# Patient Record
Sex: MALE | Race: WHITE | Employment: OTHER | URBAN - METROPOLITAN AREA
[De-identification: names, ages, dates, MRNs, and addresses within clinical notes are randomized per-mention and may not be internally consistent; named-entity substitution may affect disease eponyms.]

---

## 2020-09-06 ENCOUNTER — APPOINTMENT (OUTPATIENT)
Dept: GENERAL RADIOLOGY | Age: 70
DRG: 068 | End: 2020-09-06
Payer: MEDICARE

## 2020-09-06 ENCOUNTER — APPOINTMENT (OUTPATIENT)
Dept: CT IMAGING | Age: 70
DRG: 068 | End: 2020-09-06
Payer: MEDICARE

## 2020-09-06 ENCOUNTER — HOSPITAL ENCOUNTER (INPATIENT)
Age: 70
LOS: 2 days | Discharge: HOME | DRG: 068 | End: 2020-09-08
Attending: EMERGENCY MEDICINE | Admitting: INTERNAL MEDICINE
Payer: MEDICARE

## 2020-09-06 PROBLEM — G45.9 TIA (TRANSIENT ISCHEMIC ATTACK): Status: ACTIVE | Noted: 2020-09-06

## 2020-09-06 LAB
A/G RATIO: 1.3 (ref 1.1–2.2)
ALBUMIN SERPL-MCNC: 4.2 G/DL (ref 3.4–5)
ALP BLD-CCNC: 61 U/L (ref 40–129)
ALT SERPL-CCNC: 10 U/L (ref 10–40)
ANION GAP SERPL CALCULATED.3IONS-SCNC: 8 MMOL/L (ref 3–16)
APTT: 25.3 SEC (ref 24.2–36.2)
AST SERPL-CCNC: 28 U/L (ref 15–37)
BASE EXCESS VENOUS: 2.8 MMOL/L (ref -2–3)
BASOPHILS ABSOLUTE: 0 K/UL (ref 0–0.2)
BASOPHILS RELATIVE PERCENT: 0.9 %
BILIRUB SERPL-MCNC: 0.4 MG/DL (ref 0–1)
BUN BLDV-MCNC: 26 MG/DL (ref 7–20)
CALCIUM SERPL-MCNC: 8.9 MG/DL (ref 8.3–10.6)
CARBOXYHEMOGLOBIN: 1.9 % (ref 0–1.5)
CHLORIDE BLD-SCNC: 104 MMOL/L (ref 99–110)
CO2: 27 MMOL/L (ref 21–32)
CREAT SERPL-MCNC: 0.9 MG/DL (ref 0.8–1.3)
EOSINOPHILS ABSOLUTE: 0.2 K/UL (ref 0–0.6)
EOSINOPHILS RELATIVE PERCENT: 5 %
GFR AFRICAN AMERICAN: >60
GFR NON-AFRICAN AMERICAN: >60
GLOBULIN: 3.2 G/DL
GLUCOSE BLD-MCNC: 101 MG/DL (ref 70–99)
GLUCOSE BLD-MCNC: 93 MG/DL (ref 70–99)
HCO3 VENOUS: 27.2 MMOL/L (ref 24–28)
HCT VFR BLD CALC: 43.3 % (ref 40.5–52.5)
HEMOGLOBIN, VEN, REDUCED: 18.6 %
HEMOGLOBIN: 14.7 G/DL (ref 13.5–17.5)
INR BLD: 1.33 (ref 0.86–1.14)
LACTIC ACID: 1.1 MMOL/L (ref 0.4–2)
LYMPHOCYTES ABSOLUTE: 1.5 K/UL (ref 1–5.1)
LYMPHOCYTES RELATIVE PERCENT: 31.7 %
MCH RBC QN AUTO: 32 PG (ref 26–34)
MCHC RBC AUTO-ENTMCNC: 34.1 G/DL (ref 31–36)
MCV RBC AUTO: 93.9 FL (ref 80–100)
METHEMOGLOBIN VENOUS: 0.5 % (ref 0–1.5)
MONOCYTES ABSOLUTE: 0.7 K/UL (ref 0–1.3)
MONOCYTES RELATIVE PERCENT: 13.8 %
NEUTROPHILS ABSOLUTE: 2.4 K/UL (ref 1.7–7.7)
NEUTROPHILS RELATIVE PERCENT: 48.6 %
O2 SAT, VEN: 81 %
PCO2, VEN: 43.1 MMHG (ref 41–51)
PDW BLD-RTO: 13.3 % (ref 12.4–15.4)
PERFORMED ON: NORMAL
PH VENOUS: 7.42 (ref 7.35–7.45)
PLATELET # BLD: 132 K/UL (ref 135–450)
PMV BLD AUTO: 8.3 FL (ref 5–10.5)
PO2, VEN: 45.5 MMHG (ref 25–40)
POTASSIUM REFLEX MAGNESIUM: 5 MMOL/L (ref 3.5–5.1)
PRO-BNP: 1087 PG/ML (ref 0–124)
PROTHROMBIN TIME: 15.5 SEC (ref 10–13.2)
RBC # BLD: 4.61 M/UL (ref 4.2–5.9)
SODIUM BLD-SCNC: 139 MMOL/L (ref 136–145)
TCO2 CALC VENOUS: 29 MMOL/L
TOTAL PROTEIN: 7.4 G/DL (ref 6.4–8.2)
TROPONIN: <0.01 NG/ML
WBC # BLD: 4.9 K/UL (ref 4–11)

## 2020-09-06 PROCEDURE — 96366 THER/PROPH/DIAG IV INF ADDON: CPT

## 2020-09-06 PROCEDURE — 70496 CT ANGIOGRAPHY HEAD: CPT

## 2020-09-06 PROCEDURE — 93005 ELECTROCARDIOGRAM TRACING: CPT | Performed by: INTERNAL MEDICINE

## 2020-09-06 PROCEDURE — 85730 THROMBOPLASTIN TIME PARTIAL: CPT

## 2020-09-06 PROCEDURE — 82803 BLOOD GASES ANY COMBINATION: CPT

## 2020-09-06 PROCEDURE — 85025 COMPLETE CBC W/AUTO DIFF WBC: CPT

## 2020-09-06 PROCEDURE — 85610 PROTHROMBIN TIME: CPT

## 2020-09-06 PROCEDURE — 70450 CT HEAD/BRAIN W/O DYE: CPT

## 2020-09-06 PROCEDURE — 2000000000 HC ICU R&B

## 2020-09-06 PROCEDURE — 6370000000 HC RX 637 (ALT 250 FOR IP): Performed by: STUDENT IN AN ORGANIZED HEALTH CARE EDUCATION/TRAINING PROGRAM

## 2020-09-06 PROCEDURE — 83605 ASSAY OF LACTIC ACID: CPT

## 2020-09-06 PROCEDURE — 70498 CT ANGIOGRAPHY NECK: CPT

## 2020-09-06 PROCEDURE — 2580000003 HC RX 258: Performed by: STUDENT IN AN ORGANIZED HEALTH CARE EDUCATION/TRAINING PROGRAM

## 2020-09-06 PROCEDURE — 1200000000 HC SEMI PRIVATE

## 2020-09-06 PROCEDURE — 6360000004 HC RX CONTRAST MEDICATION: Performed by: EMERGENCY MEDICINE

## 2020-09-06 PROCEDURE — 80053 COMPREHEN METABOLIC PANEL: CPT

## 2020-09-06 PROCEDURE — 84484 ASSAY OF TROPONIN QUANT: CPT

## 2020-09-06 PROCEDURE — 83880 ASSAY OF NATRIURETIC PEPTIDE: CPT

## 2020-09-06 PROCEDURE — 71045 X-RAY EXAM CHEST 1 VIEW: CPT

## 2020-09-06 PROCEDURE — 6360000002 HC RX W HCPCS: Performed by: PHYSICIAN ASSISTANT

## 2020-09-06 PROCEDURE — 96365 THER/PROPH/DIAG IV INF INIT: CPT

## 2020-09-06 PROCEDURE — 99285 EMERGENCY DEPT VISIT HI MDM: CPT

## 2020-09-06 RX ORDER — HEPARIN SODIUM 5000 [USP'U]/ML
80 INJECTION, SOLUTION INTRAVENOUS; SUBCUTANEOUS ONCE
Status: COMPLETED | OUTPATIENT
Start: 2020-09-06 | End: 2020-09-06

## 2020-09-06 RX ORDER — POLYETHYLENE GLYCOL 3350 17 G/17G
17 POWDER, FOR SOLUTION ORAL DAILY PRN
Status: DISCONTINUED | OUTPATIENT
Start: 2020-09-06 | End: 2020-09-08 | Stop reason: HOSPADM

## 2020-09-06 RX ORDER — HEPARIN SODIUM 10000 [USP'U]/100ML
13 INJECTION, SOLUTION INTRAVENOUS CONTINUOUS
Status: DISCONTINUED | OUTPATIENT
Start: 2020-09-06 | End: 2020-09-07 | Stop reason: ALTCHOICE

## 2020-09-06 RX ORDER — PROMETHAZINE HYDROCHLORIDE 25 MG/1
12.5 TABLET ORAL EVERY 6 HOURS PRN
Status: DISCONTINUED | OUTPATIENT
Start: 2020-09-06 | End: 2020-09-08 | Stop reason: HOSPADM

## 2020-09-06 RX ORDER — HEPARIN SODIUM 5000 [USP'U]/ML
40 INJECTION, SOLUTION INTRAVENOUS; SUBCUTANEOUS PRN
Status: DISCONTINUED | OUTPATIENT
Start: 2020-09-06 | End: 2020-09-07 | Stop reason: ALTCHOICE

## 2020-09-06 RX ORDER — SODIUM CHLORIDE 0.9 % (FLUSH) 0.9 %
10 SYRINGE (ML) INJECTION EVERY 12 HOURS SCHEDULED
Status: DISCONTINUED | OUTPATIENT
Start: 2020-09-06 | End: 2020-09-08 | Stop reason: HOSPADM

## 2020-09-06 RX ORDER — ASPIRIN 81 MG/1
81 TABLET ORAL DAILY
Status: DISCONTINUED | OUTPATIENT
Start: 2020-09-06 | End: 2020-09-08 | Stop reason: HOSPADM

## 2020-09-06 RX ORDER — ASPIRIN 300 MG/1
300 SUPPOSITORY RECTAL DAILY
Status: DISCONTINUED | OUTPATIENT
Start: 2020-09-06 | End: 2020-09-08 | Stop reason: HOSPADM

## 2020-09-06 RX ORDER — HEPARIN SODIUM 5000 [USP'U]/ML
80 INJECTION, SOLUTION INTRAVENOUS; SUBCUTANEOUS PRN
Status: DISCONTINUED | OUTPATIENT
Start: 2020-09-06 | End: 2020-09-07 | Stop reason: ALTCHOICE

## 2020-09-06 RX ORDER — ATORVASTATIN CALCIUM 80 MG/1
80 TABLET, FILM COATED ORAL NIGHTLY
Status: DISCONTINUED | OUTPATIENT
Start: 2020-09-06 | End: 2020-09-08

## 2020-09-06 RX ORDER — LABETALOL HYDROCHLORIDE 5 MG/ML
10 INJECTION, SOLUTION INTRAVENOUS EVERY 10 MIN PRN
Status: DISCONTINUED | OUTPATIENT
Start: 2020-09-06 | End: 2020-09-08 | Stop reason: HOSPADM

## 2020-09-06 RX ORDER — SODIUM CHLORIDE 0.9 % (FLUSH) 0.9 %
10 SYRINGE (ML) INJECTION PRN
Status: DISCONTINUED | OUTPATIENT
Start: 2020-09-06 | End: 2020-09-08 | Stop reason: HOSPADM

## 2020-09-06 RX ORDER — ONDANSETRON 2 MG/ML
4 INJECTION INTRAMUSCULAR; INTRAVENOUS EVERY 6 HOURS PRN
Status: DISCONTINUED | OUTPATIENT
Start: 2020-09-06 | End: 2020-09-08 | Stop reason: HOSPADM

## 2020-09-06 RX ADMIN — Medication 10 ML: at 21:00

## 2020-09-06 RX ADMIN — HEPARIN SODIUM 18 UNITS/KG/HR: 10000 INJECTION, SOLUTION INTRAVENOUS at 18:30

## 2020-09-06 RX ADMIN — ASPIRIN 81 MG: 81 TABLET, COATED ORAL at 21:08

## 2020-09-06 RX ADMIN — ATORVASTATIN CALCIUM 80 MG: 80 TABLET, FILM COATED ORAL at 21:08

## 2020-09-06 RX ADMIN — HEPARIN SODIUM 6200 UNITS: 5000 INJECTION INTRAVENOUS; SUBCUTANEOUS at 18:30

## 2020-09-06 RX ADMIN — IOPAMIDOL 80 ML: 755 INJECTION, SOLUTION INTRAVENOUS at 17:55

## 2020-09-06 ASSESSMENT — ENCOUNTER SYMPTOMS
NAUSEA: 0
VOMITING: 0
SHORTNESS OF BREATH: 0
ABDOMINAL PAIN: 0

## 2020-09-06 ASSESSMENT — PAIN SCALES - GENERAL: PAINLEVEL_OUTOF10: 0

## 2020-09-06 NOTE — ED PROVIDER NOTES
ED Attending Attestation Note     Date of evaluation: 9/6/2020    This patient was seen by the advance practice provider. I have seen and examined the patient, agree with the workup, evaluation, management and diagnosis. The care plan has been discussed. I have reviewed the ECG and concur with the MATHEW's interpretation. My assessment reveals a generally quite well-appearing gentleman, pleasantly conversational, in no acute distress. He is brought to the emergency department by his wife for 2 subsequent episodes in which she noted left-sided facial droop and slurring of speech, although the patient himself did not notice the symptoms at these times. The second episode, occurring 2 to 3 hours after the first episode, was also witnessed by their son. At the time of my examination, the patient is entirely asymptomatic with an NIH stroke scale of 0. However, he is found to be in new onset a flutter, with no prior history of this, which is very concerning in light of his back to back high risk TIAs. As a member of the [address] [place] [address] stroke team, I have determined that the patient is not a candidate for TPA, and does not appear to have a significant intracranial LVO. However in the setting of these high risk TIAs and new onset a flutter, he does require frequent neuro checks in the initial portion of his admission, as he is at high risk of completing a stroke in the short-term. Critical Care:  Due to the immediate potential for life-threatening deterioration due to stereotyped TIAs with a high risk of CVA and new onset atrial flutter, I spent a total of approximately 40 minutes providing critical care. This time excludes time spent performing procedures but includes time spent on direct patient care, history retrieval, review of the chart, and discussions with patient, family, and consultant(s).        Daja Jones MD  09/06/20 6697

## 2020-09-06 NOTE — ED NOTES
Report called to ICU RN, will be down shortly to take patient to ICU     Bebe Diop RN  09/06/20 4705

## 2020-09-06 NOTE — ED PROVIDER NOTES
[facility] [address] [facility]          PHYSICIAN ASSISTANT NOTE       Date of evaluation: 9/6/2020    Chief Complaint     Facial Droop (x2 episodes today of left sided facial droop and slurred speech. presently pt at baseline a/o x4, no droop or slurred speech)      History of Present Illness     HPI: Valentine Traore is a 78 y.o. male with no pertinent PMH who presents to the emergency department with episodes of facial droop and slurred speech. The patient's wife noticed at 2 PM that he had right-sided facial droop and had slurred speech. The patient himself did not recognize any deficits at this time. She notes this lasted for approximately 5 minutes and had resolved by the time that the patient looked in the bathroom mirror. He then took a nap and when he woke up she notes that she performed several test to help determine whether or not he was having a stroke which were all negative. They then went to  their son and at 4:45 PM their son asked the patient again why his right side of his face was drooped and he had slurred speech. His wife notes that this episode lasted for longer, potentially 15 minutes though he is asymptomatic at this time. The patient notes that he has felt completely normal today, and has not appreciated anything abnormal.  He denies any headache, dizziness, chest pain or shortness of breath. He denies any personal history of A. fib and is not anticoagulated. He has never had similar symptoms in the past.  He denies any abdominal pain, nausea or vomiting. With the exception of the above, there are no aggravating or alleviating factors. Review of Systems     Review of Systems   Constitutional: Negative for chills and fever. Respiratory: Negative for shortness of breath. Cardiovascular: Negative for chest pain, palpitations and leg swelling. Gastrointestinal: Negative for abdominal pain, nausea and vomiting.    Neurological: Positive for facial asymmetry and speech difficulty. Negative for dizziness, syncope, weakness, light-headedness, numbness and headaches. As stated above, all other systems reviewed and are otherwise negative. Past Medical, Surgical, Family, and Social History     He has a past medical history of Hypertension, Seborrheic keratosis, and Xerotic eczema. He has no past surgical history on file. His family history is not on file. He reports that he has never smoked. He has never used smokeless tobacco. He reports previous alcohol use. He reports that he does not use drugs. Medications     There are no discharge medications for this patient. Allergies     He has No Known Allergies. Physical Exam     INITIAL VITALS: BP: 121/87, Temp: 98.2 °F (36.8 °C), Pulse: 84, Resp: 24, SpO2: 96 %  Physical Exam  Vitals signs and nursing note reviewed. Constitutional:       Appearance: Normal appearance. HENT:      Head: Normocephalic and atraumatic. Nose: Nose normal.      Mouth/Throat:      Mouth: Mucous membranes are moist.      Pharynx: Oropharynx is clear. Eyes:      Extraocular Movements: Extraocular movements intact. Neck:      Musculoskeletal: Normal range of motion. Cardiovascular:      Rate and Rhythm: Normal rate. Pulses: Normal pulses. Pulmonary:      Effort: Pulmonary effort is normal.   Abdominal:      General: Abdomen is flat. There is no distension. Palpations: Abdomen is soft. Tenderness: There is no abdominal tenderness. There is no guarding or rebound. Musculoskeletal: Normal range of motion. Right lower leg: No edema. Left lower leg: No edema. Skin:     General: Skin is warm and dry. Neurological:      Mental Status: He is alert. GCS: GCS eye subscore is 4. GCS verbal subscore is 5. GCS motor subscore is 6. Cranial Nerves: Cranial nerves are intact. No cranial nerve deficit. Sensory: Sensation is intact. No sensory deficit.       Motor: Motor function is intact. No weakness, tremor or pronator drift. Coordination: Coordination is intact. Romberg sign negative. Coordination normal. Heel to Shin Test normal.   Psychiatric:         Mood and Affect: Mood normal.         Behavior: Behavior normal.         Diagnostic Results     EKG   Interpreted in conjunction with emergency department physician Lurdes Seay MD  Rhythm: A. Fib vs flutter with premature aberrantly conducted complexes  Rate: normal  Axis: normal  : none  ST Segments: no acute change  T Waves: no acute change  Q Waves:none  Clinical Impression: atrial fibrillation vs. Flutter (new onset)    RADIOLOGY:  CTA NECK W CONTRAST   Final Result      1. No aneurysms, vascular occlusions, or intracranial stenoses identified. 2.  No significant stenosis in the extracranial vertebral or carotid arteries. CTA HEAD W CONTRAST   Final Result      1. No aneurysms, vascular occlusions, or intracranial stenoses identified. 2.  No significant stenosis in the extracranial vertebral or carotid arteries. XR CHEST PORTABLE   Final Result      Cardiomegaly and pulmonary vascular congestion. No acute pulmonary consolidation. CT Head WO Contrast   Final Result      1. No acute intracranial hemorrhage. 2.  Subacute versus chronic small left parietal lobe infarct with no prior studies available for comparison.           LABS:   Results for orders placed or performed during the hospital encounter of 09/06/20   CBC Auto Differential   Result Value Ref Range    WBC 4.9 4.0 - 11.0 K/uL    RBC 4.61 4.20 - 5.90 M/uL    Hemoglobin 14.7 13.5 - 17.5 g/dL    Hematocrit 43.3 40.5 - 52.5 %    MCV 93.9 80.0 - 100.0 fL    MCH 32.0 26.0 - 34.0 pg    MCHC 34.1 31.0 - 36.0 g/dL    RDW 13.3 12.4 - 15.4 %    Platelets 925 (L) 205 - 450 K/uL    MPV 8.3 5.0 - 10.5 fL    Neutrophils % 48.6 %    Lymphocytes % 31.7 %    Monocytes % 13.8 %    Eosinophils % 5.0 %    Basophils % 0.9 %    Neutrophils Absolute 2.4 1.7 - Hx, Allergies, and Family Hx were reviewed. The patient was given the following medications:  Orders Placed This Encounter   Medications    iopamidol (ISOVUE-370) 76 % injection 80 mL    heparin (porcine) injection 6,200 Units    heparin (porcine) injection 6,200 Units    heparin (porcine) injection 3,100 Units    heparin 25,000 units in dextrose 5% 250 mL infusion       CONSULTS:  IP CONSULT TO HOSPITALIST  IP CONSULT TO CRITICAL CARE  IP CONSULT TO NEUROLOGY  IP CONSULT TO CRITICAL CARE    MEDICAL DECISION MAKING / ASSESSMENT / PLAN     Vitals:    09/06/20 1735 09/06/20 1830 09/06/20 1900 09/06/20 1930   BP: 121/87 (!) 121/93 (!) 116/93 (!) 127/93   Pulse: 84 84 81 79   Resp: 24 18     Temp: 98.2 °F (36.8 °C)      TempSrc: Oral      SpO2: 96% 97% 96% 96%   Weight: 171 lb (77.6 kg)      Height: 5' 9\" (1.753 m)          Kevin Morton is a 78 y.o. male who presents to the emergency department with specific episodes of slurred speech and facial droop. The first episode lasted less than 5 minutes and occurred at 2 PM.  The second episode started at 4:45 PM and lasted for an estimated 15 minutes. The patient is currently asymptomatic and has an NIH stroke scale of 0. He denies any history of arrhythmias there was currently in A. fib versus a flutter that is rate controlled. He denies any chest pain, shortness of breath or palpitations either today or over the past several days. The patient has remained hemodynamically stable throughout their stay in the emergency department, and appears well overall. The patient has a completely nonfocal neurologic exam at this time. Given the concern for completion of patient's potential stroke a code stroke was called and the patient was taken to CT scan that shows no acute findings. His CTAs are negative for LVO as well. After his CT head was negative for bleed the patient was started on heparin since he has been in A. fib for an unknown duration of time.   Basic lab work is unremarkable and his EKG shows rate controlled A. fib versus a flutter. At this time the patient will be admitted to the ICU for every hour neuro checks at least for the next 12 to 24 hours to ensure that he does not have an additional TIA or stroke. This is discussed with the patient and his wife are in agreement with the plan. My attending for this case with Dr. Tom Bailey was on the stroke team and was carefully consulted. At this point in time, patient will require admission to the hospital for further management of their clinical condition. I spoke with the admitting team who is in agreement with plan for admission. Patient and family are in agreement with plan for admission. Patient will be cared for in the ED prior to a bed becoming available upstairs. The patient was evaluated by myself and the ED Attending Physician, Dr. Tom Bailey. All management and disposition plans were discussed and agreed upon. Clinical Impression     1. TIA (transient ischemic attack)    2. Atrial fibrillation, unspecified type [facility])        This note was dictated using voice-recognition software, which occasionally leads to inadvertent typographic errors. Disposition     DISPOSITION  - Admit.       Wesley Shortma  09/06/20 2031

## 2020-09-06 NOTE — ED TRIAGE NOTES
PT presents to the ED from home ambulatory with c/o \"stroke like symptoms\" Pts wife reports that at 1400 today and again at [facility] 81. pt had left sided facial droop and slurred speech. Pts wife reports the 1st time was about 5 minutes in duration and the 2nd time the duration was for about 15-20 mins. Presently pt is a/o x4, moving x4 extremities equally without weakness or drift. No facial droop or slurred speech presently.

## 2020-09-06 NOTE — H&P
ICU History and Physical  PGY-1    [facility]P: [facility] ([facility])    Code:Full Code  Admit Date: 9/6/2020  Diet: Diet NPO Effective Now      History of present illness:      CC: Slurred speech and facial droop    Patient is a 78 y.o. male with no PMHx presented to ED with chief complain of facial droop and slurred speech. According to his wife, she noticed that patient had facial droop and slurred speech at 2 PM. She mentions that this episode last 4-5 minutes and resolved spontaneously. However, patient did not noticed any deficit at that time. Patient had another episode of facial droop and slurred speech around 4:45. According to his wife, his facial droop and slurred speech lasted longer, about 15 minutes. Patient mentions that he has not noticed anything abnormal today. He denies having chest pain, headache, palpitation, shortness of breath, dizziness, palpitation. He denies having nausea, vomiting, fever, abdominal pain, constipation/diarhea, and lower extremity edema. In ED, patient had A-fib with normal rate. Given the concern for potential stroke, code stroke was called. CT head was done, No acute intracranial hemorrhage, Subacute versus chronic small left parietal lobe infarct with no prior studies available for comparison. CTA neck with contrast: No aneurysms, vascular occlusions, or intracranial stenoses identified, No significant stenosis in the extracranial vertebral or carotid arteries. CTA head with contrast: No aneurysms, vascular occlusions, or intracranial stenoses identified,No significant stenosis in the extracranial vertebral or carotid arteries. Chest X-ray: Cardiomegaly and pulmonary vascular congestion, No acute pulmonary consolidation. ROS:   All other systems reviewed and are negative. Past Medical / Surgical History:    Past Medical History:   Diagnosis Date    Hypertension     Seborrheic keratosis     Xerotic eczema      History reviewed.  No pertinent surgical history. Medications Prior to Admission:    No current facility-administered medications on file prior to encounter. No current outpatient medications on file prior to encounter. Allergies:  Patient has no known allergies. Social History:   TOBACCO:   reports that he has never smoked. He has never used smokeless tobacco.       ETOH:   reports previous alcohol use. Patient currently lives with his wife. Family History:   History reviewed. No pertinent family history. Vital/I&O/Physical examination:   VS:  BP (!) 128/100   Pulse 87   Temp 98.3 °F (36.8 °C) (Oral)   Resp 20   Ht 5' 9\" (1.753 m)   Wt 163 lb 12.8 oz (74.3 kg)   SpO2 96%   BMI 24.19 kg/m²     I/O:  No intake or output data in the 24 hours ending 09/06/20 2058    PE:  Physical Exam  Constitutional:       Appearance: Normal appearance. Not in acute distress. HENT:      Head: Normocephalic and atraumatic. Nose: Nose normal.      Mouth: Mucous membranes are moist.      Pharynx: Oropharynx is clear. Eyes:      Extraocular Movements: Extraocular movements intact. Neck:      Musculoskeletal: Normal range of motion. Cardiovascular:      Rate and Rhythm: S1, S2 present, Normal rate. no murmue     Pulses: Normal pulses. Pulmonary:      Effort: Pulmonary effort is normal.   Abdominal:      General: Abdomen is flat. There is no distension. Palpations: Abdomen is soft. Tenderness: There is no abdominal tenderness. There is no guarding or rebound. Musculoskeletal: Normal range of motion. Right lower leg: No edema. No cyanosis     Left lower leg: No edema. No cyanosis  Skin:     General: Skin is warm and dry.    Neurological:      Mental Status: AAO x3, CN II-XII intact,      Psychiatric:         Mood and Affect: Mood normal.         Behavior: Behavior normal.     Labs & Imaging:   LABS:  CBC:   Recent Labs     09/06/20  1745   WBC 4.9   HGB 14.7   HCT 43.3   *   MCV 93.9 Renal:   Recent Labs     09/06/20  1745      K 5.0      CO2 27   BUN 26*   CREATININE 0.9   GLUCOSE 101*   ANIONGAP 8     Hepatic:   Recent Labs     09/06/20 1745   AST 28   ALT 10   BILITOT 0.4   ALKPHOS 61     Troponin:   Recent Labs     09/06/20 1745   TROPONINI <0.01     BNP: No results for input(s): BNP in the last 72 hours. Lipids: No results for input(s): CHOL, HDL in the last 72 hours. Invalid input(s): LDLCALCU, TRIGLYCERIDE  INR:   Recent Labs     09/06/20 1745   INR 1.33*     Lactate: No results for input(s): LACTATE in the last 72 hours. ABGs:No results for input(s): PHART, ZIP2JVS, PO2ART, RFT1EGO, BEART, THGBART, M1PABSJE, GSI9LZH in the last 72 hours. UA:No results for input(s): NITRITE, COLORU, PHUR, LABCAST, WBCUA, RBCUA, MUCUS, TRICHOMONAS, YEAST, BACTERIA, CLARITYU, SPECGRAV, LEUKOCYTESUR, UROBILINOGEN, BILIRUBINUR, BLOODU, GLUCOSEU, AMORPHOUS in the last 72 hours. Invalid input(s): KETONESU     IMAGING:  CTA NECK W CONTRAST   Final Result      1. No aneurysms, vascular occlusions, or intracranial stenoses identified. 2.  No significant stenosis in the extracranial vertebral or carotid arteries. CTA HEAD W CONTRAST   Final Result      1. No aneurysms, vascular occlusions, or intracranial stenoses identified. 2.  No significant stenosis in the extracranial vertebral or carotid arteries. XR CHEST PORTABLE   Final Result      Cardiomegaly and pulmonary vascular congestion. No acute pulmonary consolidation. CT Head WO Contrast   Final Result      1. No acute intracranial hemorrhage. 2.  Subacute versus chronic small left parietal lobe infarct with no prior studies available for comparison. MRI brain with and without contrast    (Results Pending)       Assessment & Plan:    Alcira Newby is a 78 y.o. male with no PMHx presented to ED with chief complain of facial droop and slurred speech.      TIA: Pt had two episodes of slurred speech and right facial droop that was resolved spontaneously. CT head: No acute intracranial hemorrhage. CTA neck with contrast: No aneurysms, vascular occlusions, or intracranial stenoses identified, No significant stenosis in the extracranial vertebral or carotid arteries. CTA head with contrast: No aneurysms, vascular occlusions, or intracranial stenoses identified,No significant stenosis in the extracranial vertebral or carotid arteries. - aspirin 81 mg  - heparin ggt  - atorvastatin 80 mg  - Neuro check q 1hr  - Neurology consult  - Lipid panel  - MRI brain with and without contrast   - Hemoglobin A1c   - Echo   - Telemetry monitoring     Newly diagnosed A-fib: Pt presented to ED and was noticed to have new onset A-Fib with normal rate.    - CXY6PD5-[id number] score: 3, [age: 70 (score 1), TIA (score 2)]   - Heparin drip      Code Status: Full Code  FEN:  Diet NPO Effective Now  DISPO: ICU      This patient will be discussed with attending, Dr. Dima Dennison MD.    Meme Myers MD, PGY- 1  Contact via [employer]  9/6/2020,  8:58 PM

## 2020-09-07 PROBLEM — I48.19 PERSISTENT ATRIAL FIBRILLATION (HCC): Status: ACTIVE | Noted: 2020-09-07

## 2020-09-07 PROBLEM — I10 ESSENTIAL HYPERTENSION: Status: ACTIVE | Noted: 2020-09-07

## 2020-09-07 LAB
ALBUMIN SERPL-MCNC: 4.3 G/DL (ref 3.4–5)
ANION GAP SERPL CALCULATED.3IONS-SCNC: 10 MMOL/L (ref 3–16)
ANTI-XA UNFRAC HEPARIN: 0.8 IU/ML (ref 0.3–0.7)
ANTI-XA UNFRAC HEPARIN: 1.18 IU/ML (ref 0.3–0.7)
BUN BLDV-MCNC: 16 MG/DL (ref 7–20)
CALCIUM SERPL-MCNC: 9.5 MG/DL (ref 8.3–10.6)
CHLORIDE BLD-SCNC: 99 MMOL/L (ref 99–110)
CHOLESTEROL, TOTAL: 151 MG/DL (ref 0–199)
CO2: 27 MMOL/L (ref 21–32)
CREAT SERPL-MCNC: 0.8 MG/DL (ref 0.8–1.3)
EKG ATRIAL RATE: 441 BPM
EKG DIAGNOSIS: NORMAL
EKG Q-T INTERVAL: 384 MS
EKG QRS DURATION: 86 MS
EKG QTC CALCULATION (BAZETT): 456 MS
EKG R AXIS: 19 DEGREES
EKG T AXIS: 16 DEGREES
EKG VENTRICULAR RATE: 85 BPM
GFR AFRICAN AMERICAN: >60
GFR NON-AFRICAN AMERICAN: >60
GLUCOSE BLD-MCNC: 89 MG/DL (ref 70–99)
HCT VFR BLD CALC: 43.6 % (ref 40.5–52.5)
HDLC SERPL-MCNC: 29 MG/DL (ref 40–60)
HEMOGLOBIN: 15.1 G/DL (ref 13.5–17.5)
LDL CHOLESTEROL CALCULATED: 109 MG/DL
MCH RBC QN AUTO: 32 PG (ref 26–34)
MCHC RBC AUTO-ENTMCNC: 34.7 G/DL (ref 31–36)
MCV RBC AUTO: 92.3 FL (ref 80–100)
PDW BLD-RTO: 13.2 % (ref 12.4–15.4)
PHOSPHORUS: 2.7 MG/DL (ref 2.5–4.9)
PLATELET # BLD: 123 K/UL (ref 135–450)
PMV BLD AUTO: 8.8 FL (ref 5–10.5)
POTASSIUM SERPL-SCNC: 4 MMOL/L (ref 3.5–5.1)
RBC # BLD: 4.72 M/UL (ref 4.2–5.9)
SODIUM BLD-SCNC: 136 MMOL/L (ref 136–145)
TRIGL SERPL-MCNC: 66 MG/DL (ref 0–150)
VLDLC SERPL CALC-MCNC: 13 MG/DL
WBC # BLD: 5.3 K/UL (ref 4–11)

## 2020-09-07 PROCEDURE — 99223 1ST HOSP IP/OBS HIGH 75: CPT | Performed by: INTERNAL MEDICINE

## 2020-09-07 PROCEDURE — 6370000000 HC RX 637 (ALT 250 FOR IP): Performed by: PHYSICIAN ASSISTANT

## 2020-09-07 PROCEDURE — 6370000000 HC RX 637 (ALT 250 FOR IP): Performed by: STUDENT IN AN ORGANIZED HEALTH CARE EDUCATION/TRAINING PROGRAM

## 2020-09-07 PROCEDURE — 85520 HEPARIN ASSAY: CPT

## 2020-09-07 PROCEDURE — 94760 N-INVAS EAR/PLS OXIMETRY 1: CPT

## 2020-09-07 PROCEDURE — 92610 EVALUATE SWALLOWING FUNCTION: CPT

## 2020-09-07 PROCEDURE — 83036 HEMOGLOBIN GLYCOSYLATED A1C: CPT

## 2020-09-07 PROCEDURE — 85027 COMPLETE CBC AUTOMATED: CPT

## 2020-09-07 PROCEDURE — 36415 COLL VENOUS BLD VENIPUNCTURE: CPT

## 2020-09-07 PROCEDURE — 2580000003 HC RX 258: Performed by: STUDENT IN AN ORGANIZED HEALTH CARE EDUCATION/TRAINING PROGRAM

## 2020-09-07 PROCEDURE — 1200000000 HC SEMI PRIVATE

## 2020-09-07 PROCEDURE — 80061 LIPID PANEL: CPT

## 2020-09-07 PROCEDURE — 80069 RENAL FUNCTION PANEL: CPT

## 2020-09-07 RX ADMIN — ATORVASTATIN CALCIUM 80 MG: 80 TABLET, FILM COATED ORAL at 20:41

## 2020-09-07 RX ADMIN — Medication 10 ML: at 20:41

## 2020-09-07 RX ADMIN — APIXABAN 5 MG: 5 TABLET, FILM COATED ORAL at 13:19

## 2020-09-07 RX ADMIN — APIXABAN 5 MG: 5 TABLET, FILM COATED ORAL at 20:40

## 2020-09-07 RX ADMIN — ASPIRIN 81 MG: 81 TABLET, COATED ORAL at 08:15

## 2020-09-07 ASSESSMENT — PAIN SCALES - GENERAL
PAINLEVEL_OUTOF10: 0

## 2020-09-07 NOTE — PROGRESS NOTES
reviewed and are negative. Past Medical / Surgical History:    Past Medical History:   Diagnosis Date    Hypertension     Seborrheic keratosis     Xerotic eczema      History reviewed. No pertinent surgical history. Medications Prior to Admission:    No current facility-administered medications on file prior to encounter. No current outpatient medications on file prior to encounter. Allergies:  Patient has no known allergies. Social History:   TOBACCO:   reports that he has never smoked. He has never used smokeless tobacco.       ETOH:   reports previous alcohol use. Patient currently lives with his wife. Family History:   History reviewed. No pertinent family history. Vital/I&O/Physical examination:   VS:  BP (!) 132/97   Pulse 64   Temp 97.6 °F (36.4 °C) (Oral)   Resp 17   Ht 5' 9\" (1.753 m)   Wt 163 lb 12.8 oz (74.3 kg)   SpO2 99%   BMI 24.19 kg/m²     I/O:  No intake or output data in the 24 hours ending 09/07/20 0854    PE:  Physical Exam  Constitutional:       Appearance: Normal appearance. Not in acute distress. HENT:      Head: Normocephalic and atraumatic. Nose: Nose normal.      Mouth: Mucous membranes are moist.      Pharynx: Oropharynx is clear. Eyes:      Extraocular Movements: Extraocular movements intact. Neck:      Musculoskeletal: Normal range of motion. Cardiovascular:      Rate and Rhythm: S1, S2 present, Normal rate. no murmue     Pulses: Normal pulses. Pulmonary:      Effort: Pulmonary effort is normal.   Abdominal:      General: Abdomen is flat. There is no distension. Palpations: Abdomen is soft. Tenderness: There is no abdominal tenderness. There is no guarding or rebound. Musculoskeletal: Normal range of motion. Right lower leg: No edema. No cyanosis     Left lower leg: No edema. No cyanosis  Skin:     General: Skin is warm and dry.    Neurological:      Mental Status: AAO x3, CN II-XII intact,      Psychiatric:         Mood and Affect: Mood normal.         Behavior: Behavior normal.     Labs & Imaging:   LABS:  CBC:   Recent Labs     09/06/20 1745 09/07/20  0642   WBC 4.9 5.3   HGB 14.7 15.1   HCT 43.3 43.6   * 123*   MCV 93.9 92.3                            Renal:   Recent Labs     09/06/20 1745      K 5.0      CO2 27   BUN 26*   CREATININE 0.9   GLUCOSE 101*   ANIONGAP 8     Hepatic:   Recent Labs     09/06/20 1745   AST 28   ALT 10   BILITOT 0.4   ALKPHOS 61     Troponin:   Recent Labs     09/06/20 1745   TROPONINI <0.01     BNP: No results for input(s): BNP in the last 72 hours. Lipids: No results for input(s): CHOL, HDL in the last 72 hours. Invalid input(s): LDLCALCU, TRIGLYCERIDE  INR:   Recent Labs     09/06/20 1745   INR 1.33*     Lactate: No results for input(s): LACTATE in the last 72 hours. ABGs:No results for input(s): PHART, QUW1NOA, PO2ART, TXF6GVH, BEART, THGBART, C8FOATOI, HCV9YIZ in the last 72 hours. UA:No results for input(s): NITRITE, COLORU, PHUR, LABCAST, WBCUA, RBCUA, MUCUS, TRICHOMONAS, YEAST, BACTERIA, CLARITYU, SPECGRAV, LEUKOCYTESUR, UROBILINOGEN, BILIRUBINUR, BLOODU, GLUCOSEU, AMORPHOUS in the last 72 hours. Invalid input(s): KETONESU     IMAGING:  CTA NECK W CONTRAST   Final Result      1. No aneurysms, vascular occlusions, or intracranial stenoses identified. 2.  No significant stenosis in the extracranial vertebral or carotid arteries. CTA HEAD W CONTRAST   Final Result      1. No aneurysms, vascular occlusions, or intracranial stenoses identified. 2.  No significant stenosis in the extracranial vertebral or carotid arteries. XR CHEST PORTABLE   Final Result      Cardiomegaly and pulmonary vascular congestion. No acute pulmonary consolidation. CT Head WO Contrast   Final Result      1. No acute intracranial hemorrhage. 2.  Subacute versus chronic small left parietal lobe infarct with no prior studies available for comparison.       MRI brain with and without contrast    (Results Pending)       Assessment & Plan:    Stiven Crowley is a 78 y.o. male with no PMHx presented to ED with chief complain of facial droop and slurred speech. TIA: Pt had two episodes of slurred speech and right facial droop that was resolved spontaneously. CT head: No acute intracranial hemorrhage. CTA neck with contrast: No aneurysms, vascular occlusions, or intracranial stenoses identified, No significant stenosis in the extracranial vertebral or carotid arteries. CTA head with contrast: No aneurysms, vascular occlusions, or intracranial stenoses identified,No significant stenosis in the extracranial vertebral or carotid arteries.   -Continue aspirin 81 mg, atorvastatin 80 mg  -Currently on Heparin gtt. Plan to transition to Eliquis 5mg BID  -Neuro check q 1hr  -Neurology consult  -pending Lipid panel  -pending MRI brain with and without contrast   -check Hemoglobin A1c   -CANDELARIO tomorrow   -Continue Telemetry monitoring     Newly diagnosed A-fib: Pt presented to ED and was noticed to have new onset A-Fib with normal rate. -[id number] score: 3, [age: 70 (score 1), TIA (score 2)]   -On Heparin drip transition to PO Eliquis 5mg BID  -Cardio consulted and recs appreciated  -CANDELARIO tomorrow. Continue to hold BB      Code Status: Full Code  FEN:  DIET GENERAL;  DISPO: ICU      This patient will be discussed with Dr. Yesi Driscoll MD, PGY- 1  Contact via [address] [place] [address]  9/7/2020,  8:54 AM     Patient examined, findings as discussed with Dr. Malcom Acuña. Agree with assessment and plan as above. Discussed with cardiology.   Transfer to telemetry after 24-hour period of monitoring neurologic status

## 2020-09-07 NOTE — PROGRESS NOTES
Speech Language Pathology  Facility/Department: [facility] ICU   CLINICAL BEDSIDE SWALLOW EVALUATION/  Speech, language, cognitive screen  Discharge     NAME: Stanford Carbone  : 1950  MRN: [id number]    ADMISSION DATE: 2020  ADMITTING DIAGNOSIS: has TIA (transient ischemic attack) on their problem list.  ONSET DATE: 20    Recent Chest Xray 20  Cardiomegaly and pulmonary vascular congestion. No acute pulmonary consolidation. CT of head 20  1.  No acute intracranial hemorrhage. 2.  Subacute versus chronic small left parietal lobe infarct with no prior studies available for comparison. Date of Eval: 2020  Evaluating Therapist: Macy Carmona    Current Diet level:  Current Diet : NPO  Current Liquid Diet : NPO      Primary Complaint  Patient Complaint: pt is without complaints    Pain:  Pain Assessment  Pain Assessment: 0-10  Pain Level: 0    Reason for Referral  Stanford Carbone was referred for a bedside swallow evaluation to assess the efficiency of his swallow function, identify signs and symptoms of aspiration and make recommendations regarding safe dietary consistencies, effective compensatory strategies, and safe eating environment. HPI: Stanford Carbone is a 78 y.o. male with no pertinent PMH who presents to the emergency department with episodes of facial droop and slurred speech. The patient's wife noticed at 2 PM that he had right-sided facial droop and had slurred speech. The patient himself did not recognize any deficits at this time. She notes this lasted for approximately 5 minutes and had resolved by the time that the patient looked in the bathroom mirror. He then took a nap and when he woke up she notes that she performed several test to help determine whether or not he was having a stroke which were all negative.   They then went to  their son and at 4:45 PM their son asked the patient again why his right side of his face was drooped and he had slurred speech. His wife notes that this episode lasted for longer, potentially 15 minutes though he is asymptomatic at this time. The patient notes that he has felt completely normal today, and has not appreciated anything abnormal.  He denies any headache, dizziness, chest pain or shortness of breath. He denies any personal history of A. fib and is not anticoagulated. He has never had similar symptoms in the past.  He denies any abdominal pain, nausea or vomiting.     Impression  Per chart, pt is exhibiting no neurological deficits at this time. RN reported night shift RN performed swallow screen and reported pt showed no s/s aspiration and was given dinner. On this date, pt is alert and oriented x3, speech is clear, no instances of word finding difficulty, no difficulty following commands or comprehending questions at the conversation level. Of note, pt a bit slow to respond at times. Pt denies difficulty with swallowing, communication and thinking. Oral- pt is missing some teeth. ROM of oral structures was [facility]. Pt had no difficulty closing lips around spoon or drawing liquid up a straw. Pt had no dificulty with mastication with solids. There was no anterior spillage or oral residue noted with any consistency. Pharyngeal-Pt demonstrated no s/s aspiration with any consistency presented. Pt able to initiate swallow without difficulty with laryngeal movement observed. Vocal quality remained clear throughout. No coughing, throat clearing or choking observed with any consistency. Pt consumed 3oz water uninterrupted by cup without difficulty.       Dysphagia Diagnosis: Swallow function appears grossly intact  Dysphagia Outcome Severity Scale: Level 7: Normal in all situations     Treatment Plan  Requires SLP Intervention: No  Duration/Frequency of Treatment: n/a  D/C Recommendations: Home independently       Recommended Diet and Intervention  Diet Solids Recommendation: Regular  Liquid Consistency Recommendation: Thin-Make NPO if s/s aspiration emerge and alert SLP    Recommended Form of Meds: PO          Compensatory Swallowing Strategies  Compensatory Swallowing Strategies: Upright as possible for all oral intake    Treatment/Goals   1- The pt/family will demonstrate understanding of swallowing recommendations and concerns. 9/7-  The pt was educated to purpose of the visit, anatomy and physiology of the swallow, s/s of  aspiration, swallowing strategies, diet recommendations and was encouraged to alert staff if difficulty with communication or swallowing emerges. The pt stated comprehension. General  Chart Reviewed: Yes  Behavior/Cognition: Alert; Cooperative;Pleasant mood  Respiratory Status: Room air  Communication Observation: Functional  Follows Directions: Complex  Dentition: Some missing teeth; Adequate  Patient Positioning: Upright in bed  Baseline Vocal Quality: Normal  Volitional Cough: Strong  Prior Dysphagia History: no history of dysphagia  Consistencies Administered: Reg solid; Dysphagia Pureed (Dysphagia I); Thin - straw; Thin - cup; Ice Chips           Vision/Hearing  Vision  Vision: Within Functional Limits  Hearing  Hearing: Within functional limits    Oral Motor Deficits  Oral/Motor  Oral Motor:  Within functional limits    Oral Phase Dysfunction  Oral Phase  Oral Phase: WFL     Indicators of Pharyngeal Phase Dysfunction   Pharyngeal Phase  Pharyngeal Phase: WFL    Prognosis  Prognosis  Prognosis for safe diet advancement: excellent  Individuals consulted  Consulted and agree with results and recommendations: Patient;RN    Education  Patient Education: Role of SLP  Patient Education Response: Verbalizes understanding  Safety Devices in place: Yes  Type of devices: Call light within reach       Therapy Time  SLP Individual Minutes  Time In: 0728  Time Out: 0882  Minutes: 23            Pt's goal: The pt denies difficulty with swallowing and communication so did not state goal       Plan:  Pt discharged from Speech Therapy  services. Recommended diet:regular with thin liquids   Total treatment time:23  Pt's discharge plan:to go home   Discharge Plan: No further follow up needed unless s/s aspiration emerge, make pt NPO and re-refer. Discussed with RN, Skylar Blair  Needs within reach.        Rossi Taylor, [address] [place] [address] [place], [facility]- [address] [facility]  Pg # [phone]  This document will serve as a discharge summary if pt discharge before next treatment   session

## 2020-09-07 NOTE — PROGRESS NOTES
Pt alert/oriented x4 throughout the night. No signs of neurological deficits. Strength in all four extremities, equal and strong. No complaints of pain. Pt up and wlaking to and from the bathroom without difficulties.   Will cont to monitor

## 2020-09-07 NOTE — PROGRESS NOTES
Physical Therapy/Occupational Therapy  No treatment/discharge  Chart reviewed for PT/OT evaluation. Patient admitted for TIA with resolution of symptoms. Spoke with RN who reports patient is up ad allen with steady gait and not in need of PT/OT evaluations. Will sign off at this time.     [address]key [address]  Davidson Domingo OTR/L, [phone]

## 2020-09-07 NOTE — CONSULTS
[facility]  Cardiology Inpatient Consult Service                                                                                          Pt Name: Valentine Traore  Age: 78 y.o. Sex: male  : 1950  Location: [address] [place], [place] [id number]    Referring Physician: Chong Mayes MD      Reason for Consult:       Reason for Consultation/Chief Complaint:   New onset atrial fibrillation    HPI:      Valentine Traore is a 78 y.o. male with  no significant past medical history who presented to the hospital with TIA/strokelike symptoms. On arrival he was noted to be in A. fib with rate controlled. Were consulted due to new onset atrial fibrillation. He is totally asymptomatic from an A. fib perspective. Denies symptoms concerning for CAD or heart failure. He is not on anticoagulants    Histories     Past Medical History:   has a past medical history of Hypertension, Seborrheic keratosis, and Xerotic eczema. Surgical History:   has no past surgical history on file. Social History:   reports that he has never smoked. He has never used smokeless tobacco. He reports previous alcohol use. He reports that he does not use drugs. Family History:  No evidence for sudden cardiac death or premature CAD      Medications:       Home Medications  Were reviewed and are listed in nursing record. and/or listed below  Prior to Admission medications    Not on File          Inpatient Medications:   sodium chloride flush  10 mL Intravenous 2 times per day    aspirin  81 mg Oral Daily    Or    aspirin  300 mg Rectal Daily    atorvastatin  80 mg Oral Nightly       IV drips:   heparin (porcine) 13 Units/kg/hr (20 0811)       PRN:  heparin (porcine), heparin (porcine), sodium chloride flush, polyethylene glycol, promethazine **OR** ondansetron, perflutren lipid microspheres, labetalol    Allergy:     Patient has no known allergies. Review of Systems:     All 12 point review of symptoms completed. symmetrical, trachea midline, no adenopathy, thyroid: not enlarged, symmetric, no tenderness/mass/nodules, no carotid bruit or JVD       Lungs:   Clear to auscultation bilaterally, respirations unlabored   Chest Wall:  No tenderness or deformity   Heart:   Irregular rate and rhythm, S1, S2 normal, no murmur, rub or gallop PMI intact   Abdomen:   Soft, non-tender, bowel sounds active all four quadrants,  no masses, no organomegaly       Extremities: Extremities normal, atraumatic, no cyanosis or edema   Pulses: 2+ and symmetric   Skin: Skin color, texture, turgor normal, no rashes or lesions   Pysch: Normal mood and affect   Neurologic: Normal gross motor and sensory exam.  Cranial nerves intact        Labs:     Recent Labs     09/06/20 1745 09/07/20  0923    136   K 5.0 4.0   BUN 26* 16   CREATININE 0.9 0.8    99   CO2 27 27   GLUCOSE 101* 89   CALCIUM 8.9 9.5     Recent Labs     09/06/20 1745 09/07/20  0642   WBC 4.9 5.3   HGB 14.7 15.1   HCT 43.3 43.6   * 123*   MCV 93.9 92.3     No results for input(s): CHOLTOT, TRIG, HDL in the last 72 hours. Invalid input(s): LIPIDCOMM, CHOLHDL, VLDCHOL, LDL  Recent Labs     09/06/20 1745   INR 1.33*     Recent Labs     09/06/20 1745   TROPONINI <0.01     No results for input(s): BNP in the last 72 hours. No results for input(s): TSH in the last 72 hours. No results for input(s): CHOL, HDL, LDLCALC, TRIG in the last 72 hours.]    Lab Results   Component Value Date    TROPONINI <0.01 09/06/2020         Imaging:     I personally reviewed imaging studies including CXR, Stress test, TTE/CANDELARIO. Last ECG (if available) - EKG:  I have reviewed EKG with the following interpretation  A. fib, rate controlled  Telemetry:  A. fib, rate controlled    Last Stress (if available):    Last TTE/CANDELARIO(if available):    Last Cath (if available):    Last CMR  (if available):      Assessment / Plan:     New onset atrial fibrillation/TIA  -Start oral anticoagulation. , Aspirin and statin  -Given that he is rate controlled will not start beta-blockers at this point in time. -TTE tomorrow  -I would expect his heart rate to increase as the patient starts ambulating more. At that time we will start low-dose metoprolol XL. Tobacco use was discussed with the patient and educated on the negative effects. I have asked the patient to not utilize these agents. Thank you for allowing to us to participate in the care or [facility]. Further evaluation will be based upon the patient's clinical course and testing results. I have spent 40 minutes of face to face time with the patient with more than 50% spent counseling and coordinating care. All questions and concerns were addressed to the patient/family. Alternatives to my treatment were discussed. The note was completed using EMR. Every effort was made to ensure accuracy; however, inadvertent computerized transcription errors may be present. I have personally reviewed the reports and images of labs, radiological studies, cardiac studies including ECG's and telemetry, current and old medical records. Osvaldo Rosas MD, [facility], [facility]

## 2020-09-07 NOTE — PLAN OF CARE
Problem: Falls - Risk of:  Goal: Will remain free from falls  Description: Pt remains free of falls at this time. Fall precautions in place, non skid socks on, 3/4 side rails up, bed alarm on, call light and side table within reach. Bed is in lowest locked position. Fall signs posted. Pt instructed to use call button if needing assistance. Will cont to monitor. Outcome: Ongoing  Problem: HEMODYNAMIC STATUS  Goal: Patient has stable vital signs and fluid balance  Description: Vital signs remain stable at this time. Will cont to monitor. Outcome: Ongoing  Note: Vital signs are stable at this point. BP within normal limits. Pt in afib/aflutter and rate is controlled, pt afebrile. Problem: COMMUNICATION IMPAIRMENT  Goal: Ability to express needs and understand communication  Description: Pt is able to express needs and communicate without difficultuies  Outcome: Ongoing  Note: Pt able to express need and communicate without difficulties     Note: Pt remains free of falls at this time. Fall precautions in place, non skid socks on, 3/4 side rails up, bed alarm on, call light and side table within reach. Bed is in lowest locked position. Fall signs posted. Pt instructed to use call button if needing assistance. Will cont to monitor.

## 2020-09-07 NOTE — PROGRESS NOTES
Shift handoff completed, neuro status unchanged from previous shift, alert and oriented x 4, able to follow command and move all extremities, no complaints of pain, tolerating meals, ambulating without issues, able to make needs known, in chair, call light in reach. Will continue to monitor.

## 2020-09-07 NOTE — PROGRESS NOTES
Hospitalist Progress Note      [facility]P: [facility] ([facility])    Date of Admission: 9/6/2020    Chief Complaint: Facial droop, slurred speech    Hospital Course: 68-year-old male presented to the hospital concern for facial droop/slurred speech. Subjective: Doing well this morning. Denies any more episodes of facial droop or slurred speech. Denies any weakness/numbness or tingling. Medications:  Reviewed    Infusion Medications    heparin (porcine) 13 Units/kg/hr (09/07/20 0811)     Scheduled Medications    sodium chloride flush  10 mL Intravenous 2 times per day    aspirin  81 mg Oral Daily    Or    aspirin  300 mg Rectal Daily    atorvastatin  80 mg Oral Nightly     PRN Meds: heparin (porcine), heparin (porcine), sodium chloride flush, polyethylene glycol, promethazine **OR** ondansetron, perflutren lipid microspheres, labetalol    No intake or output data in the 24 hours ending 09/07/20 0940    Physical Exam Performed:    BP (!) 132/97   Pulse 64   Temp 97.6 °F (36.4 °C) (Oral)   Resp 17   Ht 5' 9\" (1.753 m)   Wt 163 lb 12.8 oz (74.3 kg)   SpO2 99%   BMI 24.19 kg/m²     General appearance: No apparent distress, appears stated age and cooperative. HEENT: Pupils equal, round, and reactive to light. Conjunctivae/corneas clear. Neck: Supple, with full range of motion. No jugular venous distention. Trachea midline. Respiratory:  Normal respiratory effort. Clear to auscultation, bilaterally without Rales/Wheezes/Rhonchi. Cardiovascular: Regular rate and rhythm with normal S1/S2 without murmurs, rubs or gallops. Abdomen: Soft, non-tender, non-distended with normal bowel sounds. Musculoskeletal: No clubbing, cyanosis or edema bilaterally. Full range of motion without deformity. Skin: Skin color, texture, turgor normal.  No rashes or lesions. Neurologic:  Neurovascularly intact without any focal sensory/motor deficits.  Cranial nerves: II-XII intact, grossly non-focal.  Psychiatric: Alert and oriented, thought content appropriate, normal insight  Capillary Refill: Brisk,< 3 seconds   Peripheral Pulses: +2 palpable, equal bilaterally       Labs:   Recent Labs     09/06/20 1745 09/07/20  0642   WBC 4.9 5.3   HGB 14.7 15.1   HCT 43.3 43.6   * 123*     Recent Labs     09/06/20  1745      K 5.0      CO2 27   BUN 26*   CREATININE 0.9   CALCIUM 8.9     Recent Labs     09/06/20  1745   AST 28   ALT 10   BILITOT 0.4   ALKPHOS 61     Recent Labs     09/06/20  1745   INR 1.33*     Recent Labs     09/06/20  1745   TROPONINI <0.01       Urinalysis:    No results found for: Eileen , BACTERIA, RBCUA, BLOODU, SPECGRAV, [address]    Radiology:  CTA NECK W CONTRAST   Final Result      1. No aneurysms, vascular occlusions, or intracranial stenoses identified. 2.  No significant stenosis in the extracranial vertebral or carotid arteries. CTA HEAD W CONTRAST   Final Result      1. No aneurysms, vascular occlusions, or intracranial stenoses identified. 2.  No significant stenosis in the extracranial vertebral or carotid arteries. XR CHEST PORTABLE   Final Result      Cardiomegaly and pulmonary vascular congestion. No acute pulmonary consolidation. CT Head WO Contrast   Final Result      1. No acute intracranial hemorrhage. 2.  Subacute versus chronic small left parietal lobe infarct with no prior studies available for comparison.       MRI brain with and without contrast    (Results Pending)           Assessment/Plan:    Active Hospital Problems    Diagnosis    TIA (transient ischemic attack) [G45.9]     Concern for transient ischemic attack  -Continue aspirin, statin  -Neurology consult  -Pending brain MRI  -Await echo  -Continue neurochecks    New onset atrial fibrillation  -[id number] score: 3  -continue Eliquis  -Cardiology following  -Await echo     DVT Prophylaxis:eliquis  Diet: DIET GENERAL;  Code Status: Full Code    Dispo -pending clinical course    Glenis Souza MD

## 2020-09-07 NOTE — CONSULTS
Neurology Consult Note  Reason for Consult: \"recurrent TIAs\"  Chief complaint: \"I was fine\"  Dr Jannette Victoria MD asked me to see Chago Yanes in consultation for evaluation of TIA    History of Present Illness:  Chago Yanes is a 78 y.o. male with a history of atrial fibrillation, hypertension, TIA who presents with reports of transient left facial weakness. He and his wife were preparing for a wedding yesterday afternoon when his wife noticed that the patient had left facial weakness. Associated with this, his speech was mildly dysarthric. His wife and son went to the wedding, and he came to the hospital. He did not feel that he had any symptoms, whatsoever - so he cannot state how long his symptoms lasted. This has never happened to him before. He is a never-smoker. He tells me he was diagnosed with atrial fibrillation while in the ER. He was started on heparin infusion in the ER. He denies any associated symptoms including aphasia, balance problems, vision changes, or any other focal weakness. Medical History:  Past Medical History:   Diagnosis Date    Atrial fibrillation ([address] 09/2020    Hypertension     Seborrheic keratosis     TIA (transient ischemic attack) 09/2020    Xerotic eczema      History reviewed. No pertinent surgical history. No medications prior to admission. No Known Allergies  History reviewed. No pertinent family history. Social History     Tobacco Use   Smoking Status Never Smoker   Smokeless Tobacco Never Used     Social History     Substance and Sexual Activity   Drug Use Never     Social History     Substance and Sexual Activity   Alcohol Use Not Currently       ROS:  Constitutional- No weight loss or fevers  Eyes- No diplopia. No photophobia. Ears/nose/throat- No dysphagia. No Dysarthria  Cardiovascular- No palpitations. No chest pain  Respiratory- No dyspnea. No Cough  Gastrointestinal- No Abdominal pain. No Vomiting. Genitourinary- No incontinence.  No urinary retention  Musculoskeletal- No myalgia. No arthralgia  Skin- No rash. No easy bruising. Psychiatric- No depression. No anxiety  Endocrine- No diabetes. No thyroid issues. Hematologic- No bleeding difficulty. No fatigue  Neurologic- No weakness. No Headache. Exam:  Blood pressure 122/85, pulse 74, temperature 97.6 °F (36.4 °C), temperature source Oral, resp. rate 14, height 5' 9\" (1.753 m), weight 163 lb 12.8 oz (74.3 kg), SpO2 96 %. See attending attestation     Labs  ED glucose 93 mg/dL  Anti Xa 0.80    Studies  CT-A head and neck: no aneurysms, vascular occlusions, or intracranial stenoses identified. No significant stenosis in the extracranial vertebral or carotid arteries    Scheduled Medications   apixaban  5 mg Oral BID    sodium chloride flush  10 mL Intravenous 2 times per day    aspirin  81 mg Oral Daily    Or    aspirin  300 mg Rectal Daily    atorvastatin  80 mg Oral Nightly     Impression:  Alcira Newby is a 78 y.o. male with hypertension who presented with reported transient left facial weakness and dysarthria in the setting of newly diagnosed atrial fibrillation. He denies any complaints, or recurrent focal weakness. TIA is very possible. Vessel imaging unrevealing. Cardiology has been consulted. Recommendations:  - OK to continue anticoagulation given resolution of symptoms. Benefit likely outweighs risk of hemorrhagic transformation.   - From a neurologic perspective, he does not necessarily need asa in addition to apixaban given unremarkable vessels.  Will leave to discretion of cardiology +/- primary team.  - Follow up with echocardiogram  - Check lipids and A1c (ordered)  - May check MRI brain w/o terence (ordered)  - Follow up with neurology in 3 months as outpatient     A copy of this note was provided for Dr Minda Butcher MD     [facility] NP  [phone]  Evenings, weekends, and off weeks please discuss neurologist on-call

## 2020-09-07 NOTE — PROGRESS NOTES
4 Eyes Admission Assessment     I agree as the admission nurse that 2 RN's have performed a thorough Head to Toe Skin Assessment on the patient. ALL assessment sites listed below have been assessed on admission. Areas assessed by both nurses: NAHUM SANDERSON and Marleen Cassidy RN  [x]   Head, Face, and Ears   [x]   Shoulders, Back, and Chest  [x]   Arms, Elbows, and Hands   [x]   Coccyx, Sacrum, and Ischium  [x]   Legs, Feet, and Heels        Does the Patient have Skin Breakdown?   No         Wilfredo Prevention initiated:  No   Wound Care Orders initiated:  No      [facility] nurse consulted for Pressure Injury (Stage 3,4, Unstageable, DTI, NWPT, and Complex wounds) or Wilfredo score 18 or lower:  No      Nurse 1 eSignature: Electronically signed by Arias Trammell RN on 9/6/20 at 10:44 PM EDT    **SHARE this note so that the co-signing nurse is able to place an eSignature**    Nurse 2 eSignature: Electronically signed by Maris Cantu RN on 9/7/20 at 6:51 AM EDT

## 2020-09-07 NOTE — PLAN OF CARE
Problem: Falls - Risk of:  Goal: Will remain free from falls  Description: Pt remains free of falls at this time. Fall precautions in place, non skid socks on, 3/4 side rails up, bed alarm on, call light and side table within reach. Bed is in lowest locked position. Fall signs posted. Pt instructed to use call button if needing assistance. Will cont to monitor. 9/7/2020 0833 by Sharon Shen RN  Outcome: Ongoing  Note: Pt is free of falls at this time. Bed wheels are locked, bed alarm is on, bed is in lowest position. Call light is within reach. Pt is aware of the risk for falls. Will continue hourly rounding to monitor. 9/6/2020 2255 by Gertrudis Key RN  Outcome: Ongoing  Note: Pt remains free of falls at this time. Fall precautions in place, non skid socks on, 3/4 side rails up, bed alarm on, call light and side table within reach. Bed is in lowest locked position. Fall signs posted. Pt instructed to use call button if needing assistance. Will cont to monitor. Goal: Absence of physical injury  Description: Absence of physical injury  Outcome: Ongoing     Problem: HEMODYNAMIC STATUS  Goal: Patient has stable vital signs and fluid balance  Description: Vital signs remain stable at this time. Will cont to monitor. 9/7/2020 8735 by Sharon Shen RN  Outcome: Ongoing  9/6/2020 2255 by Gertrudis Key RN  Outcome: Ongoing  Note: Vital signs are stable at this point. BP within normal limits. Pt in afib/aflutter and rate is controlled, pt afebrile.       Problem: ACTIVITY INTOLERANCE/IMPAIRED MOBILITY  Goal: Mobility/activity is maintained at optimum level for patient  Outcome: Ongoing     Problem: COMMUNICATION IMPAIRMENT  Goal: Ability to express needs and understand communication  Description: Pt is able to express needs and communicate without difficultuies  9/7/2020 0833 by Sharon Shen RN  Outcome: Ongoing  9/6/2020 2255 by Gertrudis Key RN  Outcome: Ongoing  Note: Pt able to express need and communicate without difficulties

## 2020-09-07 NOTE — PROGRESS NOTES
Pt awake and alert. Neuro exam unremarkable. Heparin gtt decreased per order (see MAR). Anti-Xa 0.8. Will cont to monitor.

## 2020-09-08 ENCOUNTER — APPOINTMENT (OUTPATIENT)
Dept: MRI IMAGING | Age: 70
DRG: 068 | End: 2020-09-08
Payer: MEDICARE

## 2020-09-08 VITALS
OXYGEN SATURATION: 96 % | RESPIRATION RATE: 18 BRPM | WEIGHT: 163.8 LBS | TEMPERATURE: 98.2 F | DIASTOLIC BLOOD PRESSURE: 78 MMHG | HEIGHT: 69 IN | BODY MASS INDEX: 24.26 KG/M2 | SYSTOLIC BLOOD PRESSURE: 106 MMHG | HEART RATE: 107 BPM

## 2020-09-08 LAB
CHOLESTEROL, TOTAL: 144 MG/DL (ref 0–199)
CHOLESTEROL, TOTAL: 158 MG/DL (ref 0–199)
ESTIMATED AVERAGE GLUCOSE: 116.9 MG/DL
ESTIMATED AVERAGE GLUCOSE: 116.9 MG/DL
HBA1C MFR BLD: 5.7 %
HBA1C MFR BLD: 5.7 %
HCT VFR BLD CALC: 45.6 % (ref 40.5–52.5)
HDLC SERPL-MCNC: 28 MG/DL (ref 40–60)
HDLC SERPL-MCNC: 29 MG/DL (ref 40–60)
HEMOGLOBIN: 15.7 G/DL (ref 13.5–17.5)
LDL CHOLESTEROL CALCULATED: 100 MG/DL
LDL CHOLESTEROL CALCULATED: 103 MG/DL
LV EF: 40 %
LVEF MODALITY: NORMAL
MCH RBC QN AUTO: 32.2 PG (ref 26–34)
MCHC RBC AUTO-ENTMCNC: 34.5 G/DL (ref 31–36)
MCV RBC AUTO: 93.4 FL (ref 80–100)
PDW BLD-RTO: 13.2 % (ref 12.4–15.4)
PLATELET # BLD: 117 K/UL (ref 135–450)
PMV BLD AUTO: 8.4 FL (ref 5–10.5)
RBC # BLD: 4.88 M/UL (ref 4.2–5.9)
TRIGL SERPL-MCNC: 132 MG/DL (ref 0–150)
TRIGL SERPL-MCNC: 78 MG/DL (ref 0–150)
VLDLC SERPL CALC-MCNC: 16 MG/DL
VLDLC SERPL CALC-MCNC: 26 MG/DL
WBC # BLD: 5.5 K/UL (ref 4–11)

## 2020-09-08 PROCEDURE — 85027 COMPLETE CBC AUTOMATED: CPT

## 2020-09-08 PROCEDURE — 6370000000 HC RX 637 (ALT 250 FOR IP): Performed by: PHYSICIAN ASSISTANT

## 2020-09-08 PROCEDURE — 93306 TTE W/DOPPLER COMPLETE: CPT

## 2020-09-08 PROCEDURE — 6370000000 HC RX 637 (ALT 250 FOR IP): Performed by: INTERNAL MEDICINE

## 2020-09-08 PROCEDURE — C8929 TTE W OR WO FOL WCON,DOPPLER: HCPCS

## 2020-09-08 PROCEDURE — 2580000003 HC RX 258: Performed by: STUDENT IN AN ORGANIZED HEALTH CARE EDUCATION/TRAINING PROGRAM

## 2020-09-08 PROCEDURE — 36415 COLL VENOUS BLD VENIPUNCTURE: CPT

## 2020-09-08 PROCEDURE — 6370000000 HC RX 637 (ALT 250 FOR IP): Performed by: STUDENT IN AN ORGANIZED HEALTH CARE EDUCATION/TRAINING PROGRAM

## 2020-09-08 PROCEDURE — 70551 MRI BRAIN STEM W/O DYE: CPT

## 2020-09-08 PROCEDURE — 80061 LIPID PANEL: CPT

## 2020-09-08 RX ORDER — ASPIRIN 81 MG/1
81 TABLET ORAL DAILY
Qty: 30 TABLET | Refills: 3 | Status: SHIPPED | OUTPATIENT
Start: 2020-09-09

## 2020-09-08 RX ORDER — ATORVASTATIN CALCIUM 80 MG/1
80 TABLET, FILM COATED ORAL NIGHTLY
Status: DISCONTINUED | OUTPATIENT
Start: 2020-09-08 | End: 2020-09-08 | Stop reason: HOSPADM

## 2020-09-08 RX ORDER — ATORVASTATIN CALCIUM 80 MG/1
80 TABLET, FILM COATED ORAL NIGHTLY
Qty: 30 TABLET | Refills: 3 | Status: SHIPPED | OUTPATIENT
Start: 2020-09-08

## 2020-09-08 RX ADMIN — APIXABAN 5 MG: 5 TABLET, FILM COATED ORAL at 08:11

## 2020-09-08 RX ADMIN — ASPIRIN 81 MG: 81 TABLET, COATED ORAL at 08:20

## 2020-09-08 RX ADMIN — ATORVASTATIN CALCIUM 80 MG: 80 TABLET, FILM COATED ORAL at 17:39

## 2020-09-08 RX ADMIN — APIXABAN 5 MG: 5 TABLET, FILM COATED ORAL at 17:39

## 2020-09-08 RX ADMIN — Medication 10 ML: at 08:12

## 2020-09-08 ASSESSMENT — PAIN SCALES - GENERAL
PAINLEVEL_OUTOF10: 0

## 2020-09-08 NOTE — PROGRESS NOTES
Patient in bed resting, requested to take shower earlier and provided, gown changed and skin moisturized, neuro status unchanged and has been able to ambulate to and from restroom with stand by assist, in bed resting, call light in reach. Will continue to monitor.

## 2020-09-08 NOTE — PROGRESS NOTES
Patient is alert and oriented x 4. Vitals are stable. Pt remains in a-fib, HR controlled in the 60's-80's at rest.  Will continue to monitor with activity. No neurological deficits noted, NIH score is a 0. Patient is tolerating diet with a good appetite. Patient denies pain. Jun Vang cardiology NP notified of platelet count this a.m., scheduled 81 mg still given per orders. Echocardiogram completed at bedside this a.m. Waiting for completion of scheduled MRI. Will continue to monitor.

## 2020-09-08 NOTE — DISCHARGE SUMMARY
Hospitalist Discharge Summary    Patient ID:  Darcy Jones  [id number]  56 y.o.  1950    Admit date: 9/6/2020    Discharge date: 9/8/2020    Disposition: home    Admission Diagnoses:   Patient Active Problem List   Diagnosis    TIA (transient ischemic attack)    Persistent atrial fibrillation    Essential hypertension       Discharge Diagnoses: Active Problems:    TIA (transient ischemic attack)    Persistent atrial fibrillation    Essential hypertension  Resolved Problems:    * No resolved hospital problems. *      Code Status:  Full Code    Condition:  Stable    Discharge Diet: Diet:  DIET GENERAL;    PCP to do list: Follow for improvement of symptoms    Hospital Course: 57-year-old male with past medical history hypertension presented to hospital with transient left-sided facial weakness along with dysarthria. At the time of arrival patient symptoms have mostly improved. On arrival to the hospital patient was noted to be in atrial fibrillation with normal rate. Given concern for the stroke patient had a code stroke called. Initial imaging including CT head CTA head and neck did not reveal any acute abnormalities. Due to his symptoms concerning for stroke patient was initially admitted to the ICU. He was started on aspirin, Lipitor as well as heparin for his newly diagnosed atrial fibrillation. Neurology was consulted. Patient underwent MRI of the brain we did show small patchy areas of recent infarction within the right frontal centrum semi-ovale and corona radiata. It also showed a small remote infarct in the left parieto-occipital region. Patient's echo showed reduced EF of 40% but otherwise unremarkable for PFO. These findings were discussed with cardiology who recommended that patient could be discharged but has to follow-up with them outpatient for further management regarding his reduced EF.   Neurology also cleared the patient he was discharged home in stable condition on aspirin, Lipitor and Eliquis. Discharge Medications:   Current Discharge Medication List      START taking these medications    Details   aspirin 81 MG EC tablet Take 1 tablet by mouth daily  Qty: 30 tablet, Refills: 3      apixaban (ELIQUIS) 5 MG TABS tablet Take 1 tablet by mouth 2 times daily  Qty: 60 tablet, Refills: 0      atorvastatin (LIPITOR) 80 MG tablet Take 1 tablet by mouth nightly  Qty: 30 tablet, Refills: 3           Current Discharge Medication List        Current Discharge Medication List        Current Discharge Medication List              Procedures: None    Assessment on Discharge: Stable, improved     Discharge ROS:  A complete review of systems was asked and negative except for none    Discharge Exam:  /78   Pulse 107   Temp 98.2 °F (36.8 °C) (Oral)   Resp 18   Ht 5' 9\" (1.753 m)   Wt 163 lb 12.8 oz (74.3 kg)   SpO2 96%   BMI 24.19 kg/m²     Gen: NAD  HEENT: NC/AT, moist mucous membranes, no oropharyngeal erythema or exudate  Neck: supple, trachea midline, no anterior cervical or SC LAD  Heart:  Normal s1/s2, RRR, no murmurs, gallops, or rubs. No leg edema  Lungs: CTA bilaterally, no wheeze, no rales or rhonchi, no use of accessory muscles  Abd: bowel sounds present, soft, nontender, nondistended, no masses  Extrem:  No clubbing, cyanosis, no edema  Skin: no lesion or masses  Psych:  A & O x3  Neuro: grossly intact, moves all four extremities    Pertinent Studies During Hospital Stay:  Radiology:  Ct Head Wo Contrast    Result Date: 9/6/2020  PROCEDURE: CT head without contrast INDICATION: intermittent R sided facial droop, slurred speech; COMPARISON: None. TECHNIQUE: Axial CT imaging obtained from vertex to skull base. Axial images and multiplanar reformatted images reviewed. Up-to-date CT equipment and radiation dose reduction techniques were employed. IV Contrast: None. FINDINGS: No acute intra- or extra-axial fluid collections are identified.  The ventricles are of normal size, shape, and morphology. The basilar cisterns are patent. No mass effect or midline shift is seen. There is a small subacute or chronic left parietal lobe infarct. No significant chronic small vessel ischemic disease is seen. Visualized portions of the orbits, paranasal sinuses, and mastoids appear normal. No acute fracture is identified. 1.  No acute intracranial hemorrhage. 2.  Subacute versus chronic small left parietal lobe infarct with no prior studies available for comparison. Xr Chest Portable    Result Date: 9/6/2020  EXAM: PORTABLE AP CHEST X-RAY, 1800 hours INDICATION: slurred speech COMPARISON: none FINDINGS: There is pulmonary vascular congestion. There is no focal consolidation, pleural effusion, or pneumothorax. The heart is mildly enlarged. The visible bony thorax is intact. Cardiomegaly and pulmonary vascular congestion. No acute pulmonary consolidation. Cta Neck W Contrast    Result Date: 9/6/2020  PROCEDURE: 1.  CT ANGIOGRAPHY HEAD WITH CONTRAST 2.  CT ANGIOGRAPHY NECK WITH CONTRAST INDICATION: intermittent R sided facial droop, slurred speech COMPARISON: CT head without contrast from the same day TECHNIQUE: CT angiogram of the head and neck was performed with contrast according to standard protocol. Axial images, multiplanar reformatted images, and maximum intensity projection images were reviewed for CT angiographic technique. Up-to-date CT equipment and radiation dose reduction techniques were employed. IV contrast: 80 mL Isovue 370 FINDINGS: Non-angiographic findings: No soft tissue abnormalities are identified in the neck. There is moderate cervical spondylosis. CTA Neck: The visualized aortic arch appears normal. The configuration of the brachiocephalic vessels is typical. The innominate artery and both subclavian arteries appear normal. The common carotid arteries and bilateral carotid bifurcations appear normal with no  stenosis by NASCET criteria.  The cervical internal carotid arteries appear normal. The cervical vertebral arteries appear normal. CTA Head: The distal internal carotid arteries appear normal. The anterior and middle cerebral arteries appear normal. The distal vertebral arteries appear normal. The basilar artery and posterior cerebral arteries appear normal. No aneurysms, vascular occlusions,  or intracranial stenoses are identified. 1.  No aneurysms, vascular occlusions, or intracranial stenoses identified. 2.  No significant stenosis in the extracranial vertebral or carotid arteries. Cta Head W Contrast    Result Date: 9/6/2020  PROCEDURE: 1.  CT ANGIOGRAPHY HEAD WITH CONTRAST 2.  CT ANGIOGRAPHY NECK WITH CONTRAST INDICATION: intermittent R sided facial droop, slurred speech COMPARISON: CT head without contrast from the same day TECHNIQUE: CT angiogram of the head and neck was performed with contrast according to standard protocol. Axial images, multiplanar reformatted images, and maximum intensity projection images were reviewed for CT angiographic technique. Up-to-date CT equipment and radiation dose reduction techniques were employed. IV contrast: 80 mL Isovue 370 FINDINGS: Non-angiographic findings: No soft tissue abnormalities are identified in the neck. There is moderate cervical spondylosis. CTA Neck: The visualized aortic arch appears normal. The configuration of the brachiocephalic vessels is typical. The innominate artery and both subclavian arteries appear normal. The common carotid arteries and bilateral carotid bifurcations appear normal with no  stenosis by NASCET criteria.  The cervical internal carotid arteries appear normal. The cervical vertebral arteries appear normal. CTA Head: The distal internal carotid arteries appear normal. The anterior and middle cerebral arteries appear normal. The distal vertebral arteries appear normal. The basilar artery and posterior cerebral arteries appear normal. No aneurysms, vascular occlusions,  or intracranial stenoses are identified. 1.  No aneurysms, vascular occlusions, or intracranial stenoses identified. 2.  No significant stenosis in the extracranial vertebral or carotid arteries. Mri Brain Wo Contrast    Result Date: 9/8/2020  Exam:MRI BRAIN WO CONTRAST Date:9/8/2020 10:39 AM Indication: Transient left facial weakness Comparison: 9/6/2020 Technique: Multiplanar multisequence MR images obtained of the brain without intravenous contrast. Contrast:  None FINDINGS: There are mild patchy multifocal areas of diffusion restriction within the right frontal centrum semiovale and corona radiata. No intracranial hemorrhage or extra-axial collection. Normal ventricular and sulcal size. Mild patchy periventricular and subcortical white matter hyperintense T2/FLAIR signal abnormality. Small area of encephalomalacia in the left parieto-occipital region from remote infarct. Major intracranial vascular flow voids intact. Orbits normal. Mild patchy opacity and mucosal thickening in the paranasal sinuses. Mastoid air cells clear. No suspicious marrow signal.     Small patchy areas of recent infarction within the right frontal centrum semiovale and corona radiata. No intracranial hemorrhage. Mild nonspecific white matter signal abnormality, likely chronic small vessel ischemic change. Small remote infarct in the left parietal-occipital region.           Last Labs on Discharge:     Recent Results (from the past 24 hour(s))   CBC    Collection Time: 09/08/20  4:00 AM   Result Value Ref Range    WBC 5.5 4.0 - 11.0 K/uL    RBC 4.88 4.20 - 5.90 M/uL    Hemoglobin 15.7 13.5 - 17.5 g/dL    Hematocrit 45.6 40.5 - 52.5 %    MCV 93.4 80.0 - 100.0 fL    MCH 32.2 26.0 - 34.0 pg    MCHC 34.5 31.0 - 36.0 g/dL    RDW 13.2 12.4 - 15.4 %    Platelets 145 (L) 413 - 450 K/uL    MPV 8.4 5.0 - 10.5 fL   Lipid Panel    Collection Time: 09/08/20  4:00 AM   Result Value Ref Range    Cholesterol, Total 144 0 - 199 mg/dL Triglycerides 78 0 - 150 mg/dL    HDL 28 (L) 40 - 60 mg/dL    LDL Calculated 100 (H) <100 mg/dL    VLDL Cholesterol Calculated 16 Not Established mg/dL         Follow up: with [facility] ([facility])    Note that over 30 minutes was spent in preparing discharge papers, discussing discharge with patient, medication review, etc.    Thank you [facility] ([facility]) for the opportunity to be involved in this patient's care. If you have any questions or concerns please feel free to contact me at [phone].     Electronically signed by Chong Mayes MD on 9/8/2020 at 5:24 PM

## 2020-09-08 NOTE — PROGRESS NOTES
Discharge instructions and stroke education provided to patient. Patient to follow up with Dr. Julia Lynch on 9/16 and Dr. Arnalod Sims  in 3 months. Patient verbalized understanding. Patient concerned prescriptions won't be filled tonight and would like to take ordered p.m. medications before discharge. Dr. Micky Rodgers notified and is aware, p.m. dose of eliquis and Lipitor given per orders. Patient called [address] [place] who informed patient that eliquis prescription will cost $ 500. Patient expressed concern about expense, Dr. Micky Rodgers notified via [address]. Patient states he does not want to stay in the hospital any longer and that he will pay for prescriptions. Patient left with belongings and son, Dr. Micky Rodgers notified and is aware.

## 2020-09-08 NOTE — PROGRESS NOTES
Progress Note  The patient is being evaluated for \"recurrent TIA's\"    Updates  No acute events overnight. Feels at baseline, is eager to go home. Denies any reoccurring events. Active Ambulatory Problems     Diagnosis Date Noted    No Active Ambulatory Problems     Resolved Ambulatory Problems     Diagnosis Date Noted    No Resolved Ambulatory Problems     Past Medical History:   Diagnosis Date    Atrial fibrillation ([facility] [address] 09/2020    Hypertension     Seborrheic keratosis     TIA (transient ischemic attack) 09/2020    Xerotic eczema        Current Facility-Administered Medications:     apixaban (ELIQUIS) tablet 5 mg, 5 mg, Oral, BID, Juve Brown MD, 5 mg at 09/08/20 8955    sodium chloride flush 0.9 % injection 10 mL, 10 mL, Intravenous, 2 times per day, Bhupendra Loving MD, 10 mL at 09/08/20 9270    sodium chloride flush 0.9 % injection 10 mL, 10 mL, Intravenous, PRN, Bhupendra Loving MD    polyethylene glycol (GLYCOLAX) packet 17 g, 17 g, Oral, Daily PRN, Bhupendra Loving MD    promethazine (PHENERGAN) tablet 12.5 mg, 12.5 mg, Oral, Q6H PRN **OR** ondansetron (ZOFRAN) injection 4 mg, 4 mg, Intravenous, Q6H PRN, Bhupendra Loving MD    aspirin EC tablet 81 mg, 81 mg, Oral, Daily, 81 mg at 09/08/20 0820 **OR** aspirin suppository 300 mg, 300 mg, Rectal, Daily, Bhupendra Loving MD    perflutren lipid microspheres (DEFINITY) injection 1.65 mg, 1.5 mL, Intravenous, ONCE PRN, Bhupendra Loving MD    atorvastatin (LIPITOR) tablet 80 mg, 80 mg, Oral, Nightly, Bhupendra Loving MD, 80 mg at 09/07/20 2041    labetalol (NORMODYNE;TRANDATE) injection 10 mg, 10 mg, Intravenous, Q10 Min PRN, Bhupendra Loving MD      ROS -   Eyes- No diplopia. No photophobia. Ears/nose/throat- No dysphagia. No Dysarthria  Cardiovascular- No palpitations. No chest pain  Respiratory- No dyspnea. No Cough  Neurologic- No weakness. No Headache.      apixaban  5 mg Oral BID    sodium chloride flush  10 mL Intravenous 2 times per day    aspirin  81 mg Oral Daily    Or    aspirin  300 mg Rectal Daily    atorvastatin  80 mg Oral Nightly             sodium chloride flush, polyethylene glycol, promethazine **OR** ondansetron, perflutren lipid microspheres, labetalol     Exam:  Blood pressure (!) 133/100, pulse 85, temperature 97.8 °F (36.6 °C), temperature source Oral, resp. rate 17, height 5' 9\" (1.753 m), weight 163 lb 12.8 oz (74.3 kg), SpO2 99 %. Constitutional    Vital signs: BP, HR, and RR reviewed   General Alert, no distress, well-nourished  Eyes: unable to visualize the fundi  Cardiovascular: pulses symmetric in all 4 extremities. No peripheral edema. Psychiatric: cooperative with examination, no  psychotic behavior noted. Neurologic  Mental status:   orientation to person, place, time and situation   General fund of knowledge:  grossly intact   Memory: Short term and long term intact   Attention intact as able to attend well to the exam     Language fluent in conversation   Comprehension intact; follows simple commands  Cranial nerves:   CN2: Visual Fields full w/o extinction on confrontational testing,   CN 3,4,6: extraocular muscles intact, PERRLA @ 3mm. CN5: V1: V2: V3: intact to light touch sensation bilaterally  CN7: upper and lower facial symmetric without dysarthria  CN8: hearing grossly intact to conversation. CN12: tongue very mild right deviation with protrusion. Able to move tongue side to side. Strength: Grasp: BUE strong. RUE: 5/5 Shoulder abduction, Elbow Flexion, Elbow Extension. LUE: 5/5  Shoulder abduction, Elbow Flexion, Elbow Extension. No pronator drift bilaterally. RLE: 5/5 Hip flexion, Knee flexion, Knee extension. LLE: 5/5  Hip flexion, Knee flexion, Knee extension. Sensory: light touch intact in all 4 extremities. No sensory extinction on double simultaneous stimulation  Cerebellar/coordination: finger nose finger normal without ataxia  Tone: normal in all 4 extremities  Gait: deferred for safety.        Labs  Na: unrevealing. Cardiology has been consulted    Recommendations  - Await MRI Brain w/o read. - ECHO pending  - Continue Eliquis for Afib.   - Statin therapy LDL goal <70  - Normotension BP goal <140/90  - Euglycemia HbA1c goal <7  - Follow up with neurology-Dr. Aneudy Acosta in 3 months or sooner if needed. ADDENDUM @ 0134: MRI and ECHO resulted above. No additional recommendations. Continue Eliquis, follow up with Neurology in 3 months or sooner if needed. OK for discharge. Will likely sign off this afternoon. Please do not hesitate to call back with any questions or concerns.      Chucho Chong, [address] Neurology    A copy of this note was provided for Dr Clayton Felipe MD

## 2020-09-08 NOTE — PLAN OF CARE
Problem: Falls - Risk of:  Goal: Will remain free from falls  Description: Pt remains free of falls at this time. Fall precautions in place, non skid socks on, 3/4 side rails up, bed alarm on, call light and side table within reach. Bed is in lowest locked position. Fall signs posted. Pt instructed to use call button if needing assistance. Will cont to monitor.       Outcome: Ongoing     Problem: ACTIVITY INTOLERANCE/IMPAIRED MOBILITY  Goal: Mobility/activity is maintained at optimum level for patient  Outcome: Ongoing     Problem: COMMUNICATION IMPAIRMENT  Goal: Ability to express needs and understand communication  Description: Pt is able to express needs and communicate without difficultuies  Outcome: Ongoing

## 2020-09-09 ENCOUNTER — CARE COORDINATION (OUTPATIENT)
Dept: CASE MANAGEMENT | Age: 70
End: 2020-09-09

## 2020-09-09 NOTE — CARE COORDINATION
Case Management Daily Note                    Date: 9/8/2020     Patient Name: Ag Murillo    Date of Admission: 9/6/2020  5:26 PM  YOB: 1950    Length of Stay: 2         Patient Admission Status: Inpatient  Diagnosis:TIA (transient ischemic attack) [G45.9]  TIA (transient ischemic attack) [G45.9]  TIA (transient ischemic attack) [G45.9]     ________________________________________________________________________________________  Discharge Plan: Home  No Needs Noted. Insurance: Payor: MEDICARE / Plan: MEDICARE PART A / Product Type: *No Product type* /   Is pre-cert/notification needed: No     Tentative discharge date: 9/9 vs 9/10     Current barriers: Awaiting ECHO and MRI read. Medical clearance     Referrals completed: Not Applicable    Resources/ information provided: Not indicated at this time   ________________________________________________________________________________________  PT AM-PAC:   / 24 per last evaluation on: 9/7    OT AM-PAC:   / 24 per last evaluation on: 9/7    DME Needs for discharge: N/A   ________________________________________________________________________________________  Notes/Plan of Care:   SW rounded on this date. Patient is from home at baseline with spouse. AFIB (cards work up). Patient is up ad-allen in the room. Therapy has signed off with no needs at discharge. Patient to follow up with Neurology in 3 months. SW following chart and team can re-consult should needs arise. Lauren Mai and/or his family were provided with choice of provider; he and/or his family are in agreement with the discharge plan at this time.     Care Transition Patient: Sangita Jones, MSW  [facility] [employer] [employer] - ICU   Case Management Department  Ph: [phone]
Up  Future Appointments   Date Time Provider Temo Hernandez   9/16/2020 12:00 PM MD Chirag Bear [facility] [place]       Jalyn Wells RN

## 2020-09-10 ENCOUNTER — TELEPHONE (OUTPATIENT)
Dept: CARDIOLOGY CLINIC | Age: 70
End: 2020-09-10

## 2020-09-17 ENCOUNTER — CARE COORDINATION (OUTPATIENT)
Dept: CARE COORDINATION | Age: 70
End: 2020-09-17

## 2020-09-19 NOTE — PROGRESS NOTES
Physician Progress Note      Day Potts  CSN #:                  [id number]  :                       1950  ADMIT DATE:       2020 5:26 PM  [address] [place] DATE:        2020 5:15 PM  RESPONDING  PROVIDER #:        Wendy Astudillo MD          QUERY TEXT:    Pt admitted with TIA. Pt noted to have newly diagnosed Afib. If possible,   please document in progress notes and discharge summary if you are evaluating   and /or treating any of the following: The medical record reflects the following:  Risk Factors: TIA, Afib  Clinical Indicators: The patient presents with recurrent TIA's. Newly   diagnosed AFIB this admission. Neurology consult states, \"Pt's symptoms are   consistent with TIA-Discovery of afib in the ER makes this a high likelihood   as the source\"  Treatment: Heparin gtt in the hospital and patient was sent home on ASA and   Eliquis. Options provided:  -- Embolic TIA due to possible cerebral embolism from AFIB  -- TIA unrelated to A-fib please specify other cause, please specify other   cause . -- Other - I will add my own diagnosis  -- Disagree - Not applicable / Not valid  -- Disagree - Clinically unable to determine / Unknown  -- Refer to Clinical Documentation Reviewer    PROVIDER RESPONSE TEXT:    This patient had an embolic TIA due to cerebral embolism from AFIB.     Query created by: Mart Richmond on 9/15/2020 6:55 AM      Electronically signed by:  Wendy Astudillo MD 2020 9:58 PM

## 2020-09-24 ENCOUNTER — CARE COORDINATION (OUTPATIENT)
Dept: CASE MANAGEMENT | Age: 70
End: 2020-09-24

## 2020-09-30 ENCOUNTER — CARE COORDINATION (OUTPATIENT)
Dept: CASE MANAGEMENT | Age: 70
End: 2020-09-30

## 2020-09-30 NOTE — CARE COORDINATION
[address] [facility] ([facility]) Follow Up Call  Qualifying Diagnosis of Cardiac Arrhythmia. 9/30/2020  Patient Name:  Daryl Casey   YOB: 1950  Discharge Date:  9/8/20  RARS:  Readmission Risk Score: 10    PCP:  [facility] ([facility])     Message left in compliance with HIPPA. Stated who I was, representing the [facility], Care Transitions Team. Requested to place a call to their Physician with any immediate health needs/questions/concerns. No future appointments.     Care Transitions will continue to follow per [facility] Program.  Silas Avery, RN, CTN

## 2020-10-07 ENCOUNTER — CARE COORDINATION (OUTPATIENT)
Dept: CASE MANAGEMENT | Age: 70
End: 2020-10-07

## 2020-10-07 NOTE — CARE COORDINATION
[facility] ([facility]) Follow Up Call  Qualifying Diagnosis of Cardiac Arrhythmia. 10/7/2020  Patient Name:  Diandra Massey   YOB: 1950  Discharge Date:  9/8/20  RARS:  Readmission Risk Score: 10    PCP:  [facility] (Inactive)     Assessment:          Short of Breath: ''no more maguire usual\"   Fatigue: denies   Wheezing: denies   Swelling feet, ankles: denies   Chest Pain: denies   Appetite: ok   Sleep Pattern; ok   Nausea, Sweating: denies   Heart palpitations:  denies   Dizziness: denies  [address]: denies need   Medication Needs/Questions: denies   Transportation Need: denies    Live Alone: spouse    Ability to [employer], [place]: good   Follow Up Appointment Scheduled: completed   Do you feel like you have everything you need to stay well at home? yes     No future appointments.     Care Transitions will continue to follow per [facility] Program.  Chandler De Anda RN, CTN

## 2020-10-21 ENCOUNTER — CARE COORDINATION (OUTPATIENT)
Dept: CASE MANAGEMENT | Age: 70
End: 2020-10-21

## 2020-10-21 NOTE — CARE COORDINATION
[facility] ([facility]) Follow Up Call  Qualifying Diagnosis of Cardiac Arrhythmia. 10/21/2020  Patient Name:  Ulises Stephens   YOB: 1950  Discharge Date:  9/8/20  RARS:  Readmission Risk Score: 10    PCP:  Unspecified [facility] (Inactive)     Message left in compliance with HIPPA to contact PCP with immediate Healthcare need/questions/concerns. No future appointments.     Care Transitions will continue to follow per [facility] Program.  Heidy Horton RN, CTN

## 2020-10-29 ENCOUNTER — CARE COORDINATION (OUTPATIENT)
Dept: CASE MANAGEMENT | Age: 70
End: 2020-10-29

## 2020-10-29 NOTE — CARE COORDINATION
[facility] Follow Up Call    10/29/2020    Patient: Erick Stacy  Patient : 1950   MRN: <[id number]>  Reason for Admission:   Discharge Date: 20 RARS: Readmission Risk Score: 10       Attempted to reach patient by phone for follow up; BPCI-A. HIPAA compliant message left on patient's voicemail; CTN contact information provided.        Ana Sheriff RN

## 2020-11-04 ENCOUNTER — CARE COORDINATION (OUTPATIENT)
Dept: CASE MANAGEMENT | Age: 70
End: 2020-11-04

## 2020-11-04 NOTE — CARE COORDINATION
Non madeline;carroll BPCI-A of 068 for admission to [facility] on 9/6/20.  CTN sign off for BPCI-A program.    Alyssa Landrum, RN BSN   Care Transitions Nurse  [phone]